# Patient Record
Sex: FEMALE | Race: WHITE | Employment: OTHER | URBAN - METROPOLITAN AREA
[De-identification: names, ages, dates, MRNs, and addresses within clinical notes are randomized per-mention and may not be internally consistent; named-entity substitution may affect disease eponyms.]

---

## 2020-06-05 RX ORDER — SITAGLIPTIN AND METFORMIN HYDROCHLORIDE 50; 1000 MG/1; MG/1
1 TABLET, FILM COATED ORAL 2 TIMES DAILY WITH MEALS
COMMUNITY

## 2020-06-05 RX ORDER — AMLODIPINE BESYLATE 10 MG/1
10 TABLET ORAL DAILY
COMMUNITY

## 2020-06-05 RX ORDER — FUROSEMIDE 20 MG/1
TABLET ORAL DAILY
COMMUNITY

## 2020-06-05 RX ORDER — ROSUVASTATIN CALCIUM 10 MG/1
10 TABLET, COATED ORAL
COMMUNITY

## 2020-06-05 RX ORDER — MELATONIN 5 MG
5 CAPSULE ORAL
COMMUNITY

## 2020-06-05 RX ORDER — POTASSIUM CHLORIDE 750 MG/1
10 TABLET, FILM COATED, EXTENDED RELEASE ORAL
COMMUNITY

## 2020-06-05 RX ORDER — GARLIC 1000 MG
CAPSULE ORAL
COMMUNITY

## 2020-06-05 RX ORDER — METOPROLOL TARTRATE 50 MG/1
50 TABLET ORAL 2 TIMES DAILY
COMMUNITY

## 2020-06-05 RX ORDER — CHOLECALCIFEROL (VITAMIN D3) 50 MCG
CAPSULE ORAL
COMMUNITY

## 2020-06-05 RX ORDER — ALLOPURINOL 100 MG/1
100 TABLET ORAL DAILY
COMMUNITY

## 2020-06-05 RX ORDER — TIZANIDINE HYDROCHLORIDE 4 MG/1
4 CAPSULE, GELATIN COATED ORAL 2 TIMES DAILY
COMMUNITY

## 2020-06-05 RX ORDER — GLUCOSAMINE/CHONDR SU A SOD 750-600 MG
TABLET ORAL
COMMUNITY

## 2020-06-05 RX ORDER — LISINOPRIL 20 MG/1
TABLET ORAL 2 TIMES DAILY
COMMUNITY

## 2020-06-05 NOTE — PROGRESS NOTES
Do you have a personal history of COVID-19 within the past 28 days? NO  If Yes, What was the method of testing: clinical assumption or test result? Have you had close contact with a known to be positive COVID-19 patient within the past 14 days? NO    Are you a healthcare worker or ? NO  If Yes, have you been exposed to COVID-19 without proper PPE? Do you live in a SNF, adult home or other institutional setting? NO  If Yes, have they experienced a flood of COVID-19 positive patients?     In the past 2-14 days have you had any of the following symptoms    CoughNO   New onset Shortness of breath or difficulty breathingNO    Or at least two of these symptoms:neg for all    Fever greater than 100 F   Chills   Repeated shaking with chills   Muscle pain   Headache   Sore throat   New loss of taste or smell   New onset diarrhea

## 2020-06-08 ENCOUNTER — HOSPITAL ENCOUNTER (OUTPATIENT)
Age: 65
Setting detail: OBSERVATION
Discharge: HOME | End: 2020-06-09
Attending: INTERNAL MEDICINE | Admitting: INTERNAL MEDICINE
Payer: MEDICARE

## 2020-06-08 ENCOUNTER — HOSPITAL ENCOUNTER (OUTPATIENT)
Dept: CT IMAGING | Age: 65
Discharge: HOME | End: 2020-06-08
Attending: INTERNAL MEDICINE | Admitting: INTERNAL MEDICINE
Payer: MEDICARE

## 2020-06-08 VITALS
SYSTOLIC BLOOD PRESSURE: 131 MMHG | HEIGHT: 66 IN | DIASTOLIC BLOOD PRESSURE: 51 MMHG | HEART RATE: 57 BPM | OXYGEN SATURATION: 99 % | WEIGHT: 175 LBS | TEMPERATURE: 99.2 F | RESPIRATION RATE: 16 BRPM | BODY MASS INDEX: 28.12 KG/M2

## 2020-06-08 DIAGNOSIS — N19 UNSPECIFIED KIDNEY FAILURE: ICD-10-CM

## 2020-06-08 PROBLEM — R80.9 PROTEINURIA: Status: ACTIVE | Noted: 2020-06-08

## 2020-06-08 LAB
ABO + RH BLD: NORMAL
BASOPHILS # BLD: 0 K/UL (ref 0–0.1)
BASOPHILS NFR BLD: 0 % (ref 0–1)
BLOOD GROUP ANTIBODIES SERPL: NORMAL
DIFFERENTIAL METHOD BLD: NORMAL
EOSINOPHIL # BLD: 0.3 K/UL (ref 0–0.4)
EOSINOPHIL NFR BLD: 4 % (ref 0–7)
ERYTHROCYTE [DISTWIDTH] IN BLOOD BY AUTOMATED COUNT: 14.3 % (ref 11.5–14.5)
HCT VFR BLD AUTO: 38.7 % (ref 35–47)
HCT VFR BLD AUTO: 40.7 % (ref 35–47)
HGB BLD-MCNC: 12.3 G/DL (ref 11.5–16)
HGB BLD-MCNC: 12.6 G/DL (ref 11.5–16)
IMM GRANULOCYTES # BLD AUTO: 0 K/UL (ref 0–0.04)
IMM GRANULOCYTES NFR BLD AUTO: 0 % (ref 0–0.5)
INR PPP: 1 (ref 0.9–1.1)
LYMPHOCYTES # BLD: 2.4 K/UL (ref 0.8–3.5)
LYMPHOCYTES NFR BLD: 29 % (ref 12–49)
MCH RBC QN AUTO: 26.5 PG (ref 26–34)
MCHC RBC AUTO-ENTMCNC: 31.8 G/DL (ref 30–36.5)
MCV RBC AUTO: 83.2 FL (ref 80–99)
MONOCYTES # BLD: 0.5 K/UL (ref 0–1)
MONOCYTES NFR BLD: 6 % (ref 5–13)
NEUTS SEG # BLD: 4.8 K/UL (ref 1.8–8)
NEUTS SEG NFR BLD: 61 % (ref 32–75)
NRBC # BLD: 0 K/UL (ref 0–0.01)
NRBC BLD-RTO: 0 PER 100 WBC
PLATELET # BLD AUTO: 207 K/UL (ref 150–400)
PMV BLD AUTO: 10.7 FL (ref 8.9–12.9)
PROTHROMBIN TIME: 10.3 SEC (ref 9–11.1)
RBC # BLD AUTO: 4.65 M/UL (ref 3.8–5.2)
SPECIMEN EXP DATE BLD: NORMAL
WBC # BLD AUTO: 8.1 K/UL (ref 3.6–11)

## 2020-06-08 PROCEDURE — 77030003503 HC NDL BIOP TISS BD -B

## 2020-06-08 PROCEDURE — 85610 PROTHROMBIN TIME: CPT

## 2020-06-08 PROCEDURE — 88346 IMFLUOR 1ST 1ANTB STAIN PX: CPT

## 2020-06-08 PROCEDURE — 88348 ELECTRON MICROSCOPY DX: CPT

## 2020-06-08 PROCEDURE — 74011250636 HC RX REV CODE- 250/636: Performed by: RADIOLOGY

## 2020-06-08 PROCEDURE — 88350 IMFLUOR EA ADDL 1ANTB STN PX: CPT

## 2020-06-08 PROCEDURE — 85025 COMPLETE CBC W/AUTO DIFF WBC: CPT

## 2020-06-08 PROCEDURE — 36415 COLL VENOUS BLD VENIPUNCTURE: CPT

## 2020-06-08 PROCEDURE — 74011250636 HC RX REV CODE- 250/636: Performed by: INTERNAL MEDICINE

## 2020-06-08 PROCEDURE — 77030014115

## 2020-06-08 PROCEDURE — 50200 RENAL BIOPSY PERQ: CPT

## 2020-06-08 PROCEDURE — 85014 HEMATOCRIT: CPT

## 2020-06-08 PROCEDURE — 88313 SPECIAL STAINS GROUP 2: CPT

## 2020-06-08 PROCEDURE — 77030003666 HC NDL SPINAL BD -A

## 2020-06-08 PROCEDURE — 86900 BLOOD TYPING SEROLOGIC ABO: CPT

## 2020-06-08 PROCEDURE — 99218 HC RM OBSERVATION: CPT

## 2020-06-08 PROCEDURE — 88305 TISSUE EXAM BY PATHOLOGIST: CPT

## 2020-06-08 RX ORDER — SODIUM CHLORIDE 0.9 % (FLUSH) 0.9 %
5-40 SYRINGE (ML) INJECTION EVERY 8 HOURS
Status: DISCONTINUED | OUTPATIENT
Start: 2020-06-08 | End: 2020-06-09 | Stop reason: HOSPADM

## 2020-06-08 RX ORDER — SODIUM CHLORIDE 0.9 % (FLUSH) 0.9 %
5-40 SYRINGE (ML) INJECTION AS NEEDED
Status: DISCONTINUED | OUTPATIENT
Start: 2020-06-08 | End: 2020-06-09 | Stop reason: HOSPADM

## 2020-06-08 RX ORDER — ATROPINE SULFATE 0.1 MG/ML
INJECTION INTRAVENOUS
Status: DISCONTINUED
Start: 2020-06-08 | End: 2020-06-09 | Stop reason: HOSPADM

## 2020-06-08 RX ORDER — MIDAZOLAM HYDROCHLORIDE 1 MG/ML
4 INJECTION, SOLUTION INTRAMUSCULAR; INTRAVENOUS
Status: DISCONTINUED | OUTPATIENT
Start: 2020-06-08 | End: 2020-06-12 | Stop reason: HOSPADM

## 2020-06-08 RX ORDER — SODIUM CHLORIDE 9 MG/ML
50 INJECTION, SOLUTION INTRAVENOUS CONTINUOUS
Status: DISCONTINUED | OUTPATIENT
Start: 2020-06-08 | End: 2020-06-12 | Stop reason: HOSPADM

## 2020-06-08 RX ORDER — MIDAZOLAM HYDROCHLORIDE 1 MG/ML
INJECTION, SOLUTION INTRAMUSCULAR; INTRAVENOUS
Status: DISPENSED
Start: 2020-06-08 | End: 2020-06-08

## 2020-06-08 RX ORDER — SODIUM CHLORIDE 0.9 % (FLUSH) 0.9 %
5-40 SYRINGE (ML) INJECTION AS NEEDED
Status: DISCONTINUED | OUTPATIENT
Start: 2020-06-08 | End: 2020-06-12 | Stop reason: HOSPADM

## 2020-06-08 RX ORDER — SODIUM CHLORIDE 9 MG/ML
25 INJECTION, SOLUTION INTRAVENOUS
Status: ACTIVE | OUTPATIENT
Start: 2020-06-08 | End: 2020-06-08

## 2020-06-08 RX ORDER — SODIUM CHLORIDE 0.9 % (FLUSH) 0.9 %
5-40 SYRINGE (ML) INJECTION EVERY 8 HOURS
Status: DISCONTINUED | OUTPATIENT
Start: 2020-06-08 | End: 2020-06-12 | Stop reason: HOSPADM

## 2020-06-08 RX ORDER — FENTANYL CITRATE 50 UG/ML
200 INJECTION, SOLUTION INTRAMUSCULAR; INTRAVENOUS
Status: DISCONTINUED | OUTPATIENT
Start: 2020-06-08 | End: 2020-06-12 | Stop reason: HOSPADM

## 2020-06-08 RX ORDER — HYDRALAZINE HYDROCHLORIDE 20 MG/ML
20 INJECTION INTRAMUSCULAR; INTRAVENOUS ONCE
Status: COMPLETED | OUTPATIENT
Start: 2020-06-08 | End: 2020-06-08

## 2020-06-08 RX ADMIN — MIDAZOLAM HYDROCHLORIDE 1 MG: 2 INJECTION, SOLUTION INTRAMUSCULAR; INTRAVENOUS at 13:21

## 2020-06-08 RX ADMIN — MIDAZOLAM HYDROCHLORIDE 1 MG: 2 INJECTION, SOLUTION INTRAMUSCULAR; INTRAVENOUS at 13:30

## 2020-06-08 RX ADMIN — Medication 10 ML: at 22:07

## 2020-06-08 RX ADMIN — FENTANYL CITRATE 25 MCG: 50 INJECTION INTRAMUSCULAR; INTRAVENOUS at 13:33

## 2020-06-08 RX ADMIN — FENTANYL CITRATE 25 MCG: 50 INJECTION INTRAMUSCULAR; INTRAVENOUS at 13:30

## 2020-06-08 RX ADMIN — MIDAZOLAM HYDROCHLORIDE 1 MG: 2 INJECTION, SOLUTION INTRAMUSCULAR; INTRAVENOUS at 13:39

## 2020-06-08 RX ADMIN — FENTANYL CITRATE 50 MCG: 50 INJECTION INTRAMUSCULAR; INTRAVENOUS at 13:20

## 2020-06-08 RX ADMIN — HYDRALAZINE HYDROCHLORIDE 20 MG: 20 INJECTION INTRAMUSCULAR; INTRAVENOUS at 12:29

## 2020-06-08 NOTE — PROGRESS NOTES
Pt arrives via stretcher to angio department accompanied by self for CT guided renal biopsy procedure. All assessments completed and consent was reviewed. Education given was regarding procedure, conscious sedation, post-procedure care and  management/follow-up. Opportunity for questions was provided and all questions and concerns were addressed. B/P 186/72, retake 175/66. Call placed to Dr Vignesh Washington for further orders to manage B/P. Patient reports she took all B/P meds this am, Lisinopril, Metoprolol and Amlodipine. Awaiting call back.

## 2020-06-08 NOTE — H&P
Patient name: Cindy Cifuentes  MRN: [id number]    Nephrology observation/H+P note: 65y/o F with a significant PMH of DM2, Gout, Hypercalemia presented today for elective CT guided renal biopsy after noting nephrotic range proteinuria of 5.5gm during her last visit with my partner Dr. Jcarlos White. Normal serum Cr. Serologies did show +YASMANI otherwise negative. No rashes. No gross nor microscopic hematuria. Long standing DM since 1980s but reportedly well controlled per patient. Patient seen just post right sided renal biopsy-> no complications noted. No pain. Some bradycardia noted.  Patient did require a dose of IV hydralazine pre procedure to get her BP to goal.     Assessment:  Nephrotic range proteinuria: 5.5g->s/p CT guided renal biopsy  DM2: long standing  HTN: Fair control on admission  Hypercalcemia  Gout    Plan/Recommendations:  Post biopsy order set placed  Supine for 6hrs  Hgb/Hct 3hrs post biopsy  Rack urine  Notify me with any gross hematuria  Keep NPO until we verify Hgb is stable and urine is clear  Am labs  Anticipate discharge tomorrow am        ROS:   No nausea, no vomiting  No chest pain, no shortness of breath    Exam:  Visit Vitals  /72   Pulse 61   Temp 98.9 °F (37.2 °C)   Resp 16   SpO2 98%     Wt Readings from Last 3 Encounters:   06/08/20 79.4 kg (175 lb)       Intake/Output Summary (Last 24 hours) at 6/8/2020 1431  Last data filed at 6/8/2020 0859  Gross per 24 hour   Intake --   Output 100 ml   Net -100 ml       Gen: NAD  HEENT: No icterus, mmm, no oral exudate, AT/NC  Neck: No JVD. No cervical lymphadenopathy. No carotid bruit  Lungs/Chest wall: Clear. No accessory muscle use. Bilateral symmetrical chest wall expansion  Cardiovascular: Regular rate, normal rhythm. Palpable peripheral pulses. No murmur. No pericardial rub  Abdomen/: Soft, NT, ND, BS+. No palpable organomegaly  Ext: No clubbing, No cyanosis. No peripheral edema  Skin: warm, dry. No rash or ulcers  CNS: alert and awake.  Answers appropriately      Current Facility-Administered Medications   Medication Dose Route Frequency Last Dose    sodium chloride (NS) flush 5-40 mL  5-40 mL IntraVENous Q8H      sodium chloride (NS) flush 5-40 mL  5-40 mL IntraVENous PRN      0.9% sodium chloride infusion  25 mL/hr IntraVENous RAD ONCE       Facility-Administered Medications Ordered in Other Encounters   Medication Dose Route Frequency Last Dose    sodium chloride (NS) flush 5-40 mL  5-40 mL IntraVENous Q8H      sodium chloride (NS) flush 5-40 mL  5-40 mL IntraVENous PRN      fentaNYL citrate (PF) injection 200 mcg  200 mcg IntraVENous RAD PRN 25 mcg at 06/08/20 1333    midazolam (VERSED) injection 4 mg  4 mg IntraVENous RAD PRN 1 mg at 06/08/20 1339    0.9% sodium chloride infusion  50 mL/hr IntraVENous CONTINUOUS      midazolam (VERSED) 1 mg/mL injection          atropine 0.1 mg/mL injection             Labs/Data:    Lab Results   Component Value Date/Time    WBC 8.1 06/08/2020 08:40 AM    HGB 12.3 06/08/2020 08:40 AM    HCT 38.7 06/08/2020 08:40 AM    PLATELET 399 12/75/6222 08:40 AM    MCV 83.2 06/08/2020 08:40 AM       Lab Results   Component Value Date/Time    Sodium 135 (L) 05/11/2010 03:11 PM    Potassium 4.8 05/11/2010 03:11 PM    Chloride 100 05/11/2010 03:11 PM    CO2 24 05/11/2010 03:11 PM    Anion gap 11 05/11/2010 03:11 PM    Glucose 188 (H) 05/11/2010 03:11 PM    BUN 34 (H) 05/11/2010 03:11 PM    Creatinine 1.0 05/11/2010 03:11 PM    BUN/Creatinine ratio 34 (H) 05/11/2010 03:11 PM    GFR est AA >60 05/11/2010 03:11 PM    GFR est non-AA >60 05/11/2010 03:11 PM    Calcium 10.7 (H) 05/11/2010 03:11 PM    Calcium 10.6 (H) 05/11/2010 03:11 PM       Patient seen and examined. Chart reviewed. Labs, data and other pertinent notes reviewed in last 24 hrs.     Discussed with patient    Signed by:  Remedios Gutiérrez MD  [facility]

## 2020-06-08 NOTE — PROGRESS NOTES
Dr. Bernadette Fernando called back, Hydralazine 20 mg IV a/o with good effect. Patient underwent procedure and tolerated well. Post procedure Dr Bernadette Fernando in to see patient. Informed patient of need to remain on bedrest 6 hours post w/BRP. No c/o pain, dsg dry/intact on right flank area.

## 2020-06-08 NOTE — PROGRESS NOTES
TRANSFER - OUT REPORT:    Verbal report given to Elvia Orona on Forrest ComerÃ­o  being transferred to [facility] [address] [facility] for routine progression of care       Report consisted of patients Situation, Background, Assessment and   Recommendations(SBAR). Information from the following report(s) SBAR and Procedure Summary was reviewed with the receiving nurse. Lines:   Peripheral IV 06/08/20 Right Antecubital (Active)   Site Assessment Clean, dry, & intact 6/8/2020  8:40 AM   Phlebitis Assessment 0 6/8/2020  8:40 AM   Infiltration Assessment 0 6/8/2020  8:40 AM   Dressing Status Clean, dry, & intact 6/8/2020  8:40 AM   Dressing Type Transparent 6/8/2020  8:40 AM   Hub Color/Line Status Blue;Flushed;Capped 6/8/2020  8:40 AM   Alcohol Cap Used Yes 6/8/2020  8:40 AM        Opportunity for questions and clarification was provided.       Patient transported with:   [employer]

## 2020-06-09 VITALS
OXYGEN SATURATION: 98 % | DIASTOLIC BLOOD PRESSURE: 68 MMHG | RESPIRATION RATE: 16 BRPM | TEMPERATURE: 98.3 F | SYSTOLIC BLOOD PRESSURE: 153 MMHG | HEART RATE: 52 BPM

## 2020-06-09 LAB
ALBUMIN SERPL-MCNC: 2.7 G/DL (ref 3.5–5)
ALBUMIN/GLOB SERPL: 0.8 {RATIO} (ref 1.1–2.2)
ALP SERPL-CCNC: 50 U/L (ref 45–117)
ALT SERPL-CCNC: 19 U/L (ref 12–78)
ANION GAP SERPL CALC-SCNC: 7 MMOL/L (ref 5–15)
AST SERPL-CCNC: 13 U/L (ref 15–37)
BILIRUB SERPL-MCNC: 0.3 MG/DL (ref 0.2–1)
BUN SERPL-MCNC: 28 MG/DL (ref 6–20)
BUN/CREAT SERPL: 44 (ref 12–20)
CALCIUM SERPL-MCNC: 9.7 MG/DL (ref 8.5–10.1)
CHLORIDE SERPL-SCNC: 111 MMOL/L (ref 97–108)
CO2 SERPL-SCNC: 23 MMOL/L (ref 21–32)
CREAT SERPL-MCNC: 0.64 MG/DL (ref 0.55–1.02)
ERYTHROCYTE [DISTWIDTH] IN BLOOD BY AUTOMATED COUNT: 14.8 % (ref 11.5–14.5)
GLOBULIN SER CALC-MCNC: 3.2 G/DL (ref 2–4)
GLUCOSE SERPL-MCNC: 115 MG/DL (ref 65–100)
HCT VFR BLD AUTO: 35.2 % (ref 35–47)
HGB BLD-MCNC: 11 G/DL (ref 11.5–16)
MAGNESIUM SERPL-MCNC: 2 MG/DL (ref 1.6–2.4)
MCH RBC QN AUTO: 26.2 PG (ref 26–34)
MCHC RBC AUTO-ENTMCNC: 31.3 G/DL (ref 30–36.5)
MCV RBC AUTO: 83.8 FL (ref 80–99)
NRBC # BLD: 0 K/UL (ref 0–0.01)
NRBC BLD-RTO: 0 PER 100 WBC
PHOSPHATE SERPL-MCNC: 3.5 MG/DL (ref 2.6–4.7)
PLATELET # BLD AUTO: 195 K/UL (ref 150–400)
PMV BLD AUTO: 10.9 FL (ref 8.9–12.9)
POTASSIUM SERPL-SCNC: 4.2 MMOL/L (ref 3.5–5.1)
PROT SERPL-MCNC: 5.9 G/DL (ref 6.4–8.2)
RBC # BLD AUTO: 4.2 M/UL (ref 3.8–5.2)
SODIUM SERPL-SCNC: 141 MMOL/L (ref 136–145)
WBC # BLD AUTO: 8.1 K/UL (ref 3.6–11)

## 2020-06-09 PROCEDURE — 99218 HC RM OBSERVATION: CPT

## 2020-06-09 PROCEDURE — 83735 ASSAY OF MAGNESIUM: CPT

## 2020-06-09 PROCEDURE — 84100 ASSAY OF PHOSPHORUS: CPT

## 2020-06-09 PROCEDURE — 85027 COMPLETE CBC AUTOMATED: CPT

## 2020-06-09 PROCEDURE — 36415 COLL VENOUS BLD VENIPUNCTURE: CPT

## 2020-06-09 PROCEDURE — 80053 COMPREHEN METABOLIC PANEL: CPT

## 2020-06-09 NOTE — ROUTINE PROCESS
Bedside shift change report given to Faisal Yuen RN (oncoming nurse) by Kole Hinojosa RN (offgoing nurse). Report included the following information SBAR and Kardex.

## 2020-06-09 NOTE — PROGRESS NOTES
Patient name: Monica Pink  MRN: [id number]    Nephrology observation/H+P note: 65y/o F with a significant PMH of DM2, Gout, Hypercalemia presented today for elective CT guided renal biopsy after noting nephrotic range proteinuria of 5.5gm during her last visit with my partner Dr. Carolina Bassett. Normal serum Cr. Serologies did show +YASMANI otherwise negative. No rashes. No gross nor microscopic hematuria. Long standing DM since 1980s but reportedly well controlled per patient. Patient seen just post right sided renal biopsy-> no complications noted. No pain. Some bradycardia noted. Patient did require a dose of IV hydralazine pre procedure to get her BP to goal.     Assessment:  Nephrotic range proteinuria: 5.5g->s/p right sided CT guided renal biopsy 6/8/20. DM2: long standing  HTN: Fair control on admission  Hypercalcemia  Gout    Plan/Recommendations:  Stable for discharge  Resume prior medicine-> no changes  Limited/light activity for the next week  Went over discharge instructions with patient  F/u with Dr. Igor Zuniga in approx 2wks to go over biopsy results    Subjective:  No events overnight. Denies any right flank pain. No hematuria.        ROS:   No nausea, no vomiting  No chest pain, no shortness of breath    Exam:  Visit Vitals  /68 (BP 1 Location: Left arm, BP Patient Position: At rest)   Pulse (!) 52   Temp 98.3 °F (36.8 °C)   Resp 16   SpO2 98%     Wt Readings from Last 3 Encounters:   06/08/20 79.4 kg (175 lb)       Intake/Output Summary (Last 24 hours) at 6/9/2020 5852  Last data filed at 6/9/2020 0803  Gross per 24 hour   Intake --   Output 2500 ml   Net -2500 ml       Gen: NAD  HEENT:  AT/NC  Lungs/Chest wall: Clear. No accessory muscle use. Cardiovascular: Regular rate, normal rhythm. Abdomen/: Soft, NT, ND, BS+. Ext:  No peripheral edema  Skin: warm, dry. No rash or ulcers  CNS: alert and awake. Answers appropriately      Current Facility-Administered Medications   Medication Dose Route Frequency Last Dose    sodium chloride (NS) flush 5-40 mL  5-40 mL IntraVENous Q8H 10 mL at 06/08/20 2207    sodium chloride (NS) flush 5-40 mL  5-40 mL IntraVENous PRN       Facility-Administered Medications Ordered in Other Encounters   Medication Dose Route Frequency Last Dose    sodium chloride (NS) flush 5-40 mL  5-40 mL IntraVENous Q8H      sodium chloride (NS) flush 5-40 mL  5-40 mL IntraVENous PRN      fentaNYL citrate (PF) injection 200 mcg  200 mcg IntraVENous RAD PRN 25 mcg at 06/08/20 1333    midazolam (VERSED) injection 4 mg  4 mg IntraVENous RAD PRN 1 mg at 06/08/20 1339    0.9% sodium chloride infusion  50 mL/hr IntraVENous CONTINUOUS         Labs/Data:    Lab Results   Component Value Date/Time    WBC 8.1 06/09/2020 04:13 AM    HGB 11.0 (L) 06/09/2020 04:13 AM    HCT 35.2 06/09/2020 04:13 AM    PLATELET 127 13/40/8899 04:13 AM    MCV 83.8 06/09/2020 04:13 AM       Lab Results   Component Value Date/Time    Sodium 141 06/09/2020 04:13 AM    Potassium 4.2 06/09/2020 04:13 AM    Chloride 111 (H) 06/09/2020 04:13 AM    CO2 23 06/09/2020 04:13 AM    Anion gap 7 06/09/2020 04:13 AM    Glucose 115 (H) 06/09/2020 04:13 AM    BUN 28 (H) 06/09/2020 04:13 AM    Creatinine 0.64 06/09/2020 04:13 AM    BUN/Creatinine ratio 44 (H) 06/09/2020 04:13 AM    GFR est AA >60 06/09/2020 04:13 AM    GFR est non-AA >60 06/09/2020 04:13 AM    Calcium 9.7 06/09/2020 04:13 AM       Patient seen and examined. Chart reviewed.  Labs, data and other pertinent notes reviewed in last 24 hrs.    Discussed with patient and RN    Signed by:  Rex Raphael MD  [address] [facility]

## 2022-03-18 PROBLEM — R80.9 PROTEINURIA: Status: ACTIVE | Noted: 2020-06-08

## 2023-05-11 RX ORDER — AMLODIPINE BESYLATE 10 MG/1
10 TABLET ORAL DAILY
COMMUNITY

## 2023-05-11 RX ORDER — LISINOPRIL 20 MG/1
TABLET ORAL 2 TIMES DAILY
COMMUNITY

## 2023-05-11 RX ORDER — TIZANIDINE HYDROCHLORIDE 4 MG/1
CAPSULE, GELATIN COATED ORAL 2 TIMES DAILY
COMMUNITY

## 2023-05-11 RX ORDER — ALLOPURINOL 100 MG/1
100 TABLET ORAL DAILY
COMMUNITY

## 2023-05-11 RX ORDER — METOPROLOL TARTRATE 50 MG/1
TABLET, FILM COATED ORAL 2 TIMES DAILY
COMMUNITY

## 2023-05-11 RX ORDER — FUROSEMIDE 20 MG/1
TABLET ORAL DAILY
COMMUNITY

## 2023-05-11 RX ORDER — ROSUVASTATIN CALCIUM 10 MG/1
10 TABLET, COATED ORAL NIGHTLY
COMMUNITY

## 2023-05-11 RX ORDER — POTASSIUM CHLORIDE 750 MG/1
TABLET, FILM COATED, EXTENDED RELEASE ORAL
COMMUNITY

## 2023-05-11 RX ORDER — SITAGLIPTIN AND METFORMIN HYDROCHLORIDE 1000; 50 MG/1; MG/1
1 TABLET, FILM COATED ORAL 2 TIMES DAILY WITH MEALS
COMMUNITY